# Patient Record
Sex: FEMALE | Race: WHITE
[De-identification: names, ages, dates, MRNs, and addresses within clinical notes are randomized per-mention and may not be internally consistent; named-entity substitution may affect disease eponyms.]

---

## 2021-04-27 ENCOUNTER — HOSPITAL ENCOUNTER (EMERGENCY)
Dept: HOSPITAL 35 - ER | Age: 26
Discharge: HOME | End: 2021-04-27
Payer: COMMERCIAL

## 2021-04-27 VITALS — DIASTOLIC BLOOD PRESSURE: 57 MMHG | SYSTOLIC BLOOD PRESSURE: 106 MMHG

## 2021-04-27 VITALS — HEIGHT: 67 IN | WEIGHT: 130.01 LBS | BODY MASS INDEX: 20.4 KG/M2

## 2021-04-27 DIAGNOSIS — R55: ICD-10-CM

## 2021-04-27 DIAGNOSIS — Z3A.16: ICD-10-CM

## 2021-04-27 DIAGNOSIS — O9A.212: Primary | ICD-10-CM
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
## 2021-04-27 DIAGNOSIS — O26.892: ICD-10-CM
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
## 2021-04-27 DIAGNOSIS — Z88.6: ICD-10-CM

## 2021-04-27 DIAGNOSIS — S01.01XA: ICD-10-CM
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
## 2021-04-27 LAB
ALBUMIN SERPL-MCNC: 2.9 G/DL (ref 3.4–5)
ALT SERPL-CCNC: 14 U/L (ref 30–65)
ANION GAP SERPL CALC-SCNC: 9 MMOL/L (ref 7–16)
AST SERPL-CCNC: 10 U/L (ref 15–37)
BASOPHILS NFR BLD AUTO: 0.2 % (ref 0–2)
BILIRUB DIRECT SERPL-MCNC: < 0.1 MG/DL
BILIRUB SERPL-MCNC: 0.2 MG/DL (ref 0.2–1)
BILIRUB UR-MCNC: NEGATIVE MG/DL
BUN SERPL-MCNC: 11 MG/DL (ref 7–18)
CALCIUM SERPL-MCNC: 8.1 MG/DL (ref 8.5–10.1)
CHLORIDE SERPL-SCNC: 102 MMOL/L (ref 98–107)
CO2 SERPL-SCNC: 24 MMOL/L (ref 21–32)
COLOR UR: YELLOW
CREAT SERPL-MCNC: 0.6 MG/DL (ref 0.6–1)
EOSINOPHIL NFR BLD: 0.4 % (ref 0–3)
ERYTHROCYTE [DISTWIDTH] IN BLOOD BY AUTOMATED COUNT: 12.9 % (ref 10.5–14.5)
GLUCOSE SERPL-MCNC: 88 MG/DL (ref 74–106)
GRANULOCYTES NFR BLD MANUAL: 81.9 % (ref 36–66)
HCT VFR BLD CALC: 33.4 % (ref 37–47)
HGB BLD-MCNC: 11.1 GM/DL (ref 12–15)
KETONES UR STRIP-MCNC: NEGATIVE MG/DL
LYMPHOCYTES NFR BLD AUTO: 11.1 % (ref 24–44)
MCH RBC QN AUTO: 31.3 PG (ref 26–34)
MCHC RBC AUTO-ENTMCNC: 33.2 G/DL (ref 28–37)
MCV RBC: 94.3 FL (ref 80–100)
MONOCYTES NFR BLD: 6.4 % (ref 1–8)
NEUTROPHILS # BLD: 10.3 THOU/UL (ref 1.4–8.2)
PLATELET # BLD: 210 THOU/UL (ref 150–400)
POTASSIUM SERPL-SCNC: 3.7 MMOL/L (ref 3.5–5.1)
PROT SERPL-MCNC: 6.5 G/DL (ref 6.4–8.2)
RBC # BLD AUTO: 3.54 MIL/UL (ref 4.2–5)
RBC # UR STRIP: NEGATIVE /UL
SODIUM SERPL-SCNC: 135 MMOL/L (ref 136–145)
SP GR UR STRIP: 1.01 (ref 1–1.03)
TROPONIN I SERPL-MCNC: <0.06 NG/ML (ref ?–0.06)
URINE CLARITY: CLEAR
URINE GLUCOSE-RANDOM*: NEGATIVE
URINE LEUKOCYTES-REFLEX: (no result)
URINE NITRITE-REFLEX: NEGATIVE
URINE PROTEIN (DIPSTICK): NEGATIVE
UROBILINOGEN UR STRIP-ACNC: 0.2 E.U./DL (ref 0.2–1)
WBC # BLD AUTO: 12.6 THOU/UL (ref 4–11)

## 2021-04-28 NOTE — EKG
[place] [address] [address] [facility] [place]  [address]
Phone:  [phone]                    ELECTROCARDIOGRAM REPORT      
_______________________________________________________________________________
 
Name:       CJ RAYO          Room #:                     DEP   
DAPHNE#:      [id number]     Account #:      [id number]  
Admission:  21    Attend Phys:                          
Discharge:  21    Date of Birth:  [birth year]  
                                                          Report #: [id number]
   [id number]
_______________________________________________________________________________
                         [facility] ED
                                       
Test Date:    2021               Test Time:    21:44:22
Pat Name:     CJ RAYO     Department:   
Patient ID:   SJOMO-                   Room:          
Gender:       F                        Technician:   YADIRA
:          1995               Requested By: Ariel Johnson
Order Number: [id number] MD:   Jass Simpson
                                 Measurements
Intervals                              Axis          
Rate:         77                       P:            57
DC:           154                      QRS:          48
QRSD:         80                       T:            31
QT:           356                                    
QTc:          403                                    
                           Interpretive Statements
Sinus rhythm
RSR' in V1 or V2, probably normal variant
No previous ECG available for comparison
Electronically Signed On 2021 7:00:20 CDT by Jass Simpson
[email or web address]
 
 
 
 
 
 
 
 
 
 
 
 
 
 
 
 
 
 
 
 
 
 
  <ELECTRONICALLY SIGNED>
   By: Jass Simpson MD, [facility]    
  21     0700
D: 21 2144                           _____________________________________
T: 21 2144                           Jsas Simpson MD, FACC      /EPI